# Patient Record
Sex: MALE | Race: WHITE | NOT HISPANIC OR LATINO | Employment: OTHER | URBAN - METROPOLITAN AREA
[De-identification: names, ages, dates, MRNs, and addresses within clinical notes are randomized per-mention and may not be internally consistent; named-entity substitution may affect disease eponyms.]

---

## 2017-08-07 ENCOUNTER — OFFICE VISIT (OUTPATIENT)
Dept: NEUROLOGY | Facility: CLINIC | Age: 82
End: 2017-08-07
Payer: MEDICARE

## 2017-08-07 VITALS
DIASTOLIC BLOOD PRESSURE: 70 MMHG | SYSTOLIC BLOOD PRESSURE: 146 MMHG | WEIGHT: 186.5 LBS | BODY MASS INDEX: 27.62 KG/M2 | HEIGHT: 69 IN | RESPIRATION RATE: 16 BRPM | HEART RATE: 64 BPM

## 2017-08-07 DIAGNOSIS — M54.12 CERVICAL RADICULAR PAIN: Primary | ICD-10-CM

## 2017-08-07 PROCEDURE — 1159F MED LIST DOCD IN RCRD: CPT | Mod: S$GLB,,, | Performed by: PSYCHIATRY & NEUROLOGY

## 2017-08-07 PROCEDURE — 99214 OFFICE O/P EST MOD 30 MIN: CPT | Mod: S$GLB,,, | Performed by: PSYCHIATRY & NEUROLOGY

## 2017-08-07 PROCEDURE — 99999 PR PBB SHADOW E&M-EST. PATIENT-LVL III: CPT | Mod: PBBFAC,,, | Performed by: PSYCHIATRY & NEUROLOGY

## 2017-08-07 PROCEDURE — 3008F BODY MASS INDEX DOCD: CPT | Mod: S$GLB,,, | Performed by: PSYCHIATRY & NEUROLOGY

## 2017-08-07 PROCEDURE — 1126F AMNT PAIN NOTED NONE PRSNT: CPT | Mod: S$GLB,,, | Performed by: PSYCHIATRY & NEUROLOGY

## 2017-08-07 RX ORDER — NIFEDIPINE 60 MG/1
30 TABLET, EXTENDED RELEASE ORAL DAILY
COMMUNITY

## 2017-08-07 RX ORDER — TRAMADOL HYDROCHLORIDE 50 MG/1
50 TABLET ORAL EVERY 6 HOURS PRN
COMMUNITY
End: 2017-12-15

## 2017-08-07 RX ORDER — GABAPENTIN 100 MG/1
CAPSULE ORAL
Qty: 60 CAPSULE | Refills: 12 | Status: SHIPPED | OUTPATIENT
Start: 2017-08-07 | End: 2017-12-15

## 2017-08-07 NOTE — PROGRESS NOTES
HPI: Jesus Burgess is a 81 y.o. male with 2-3 years of episodes of left cervical radicular pain   He still has the pain in the left neck and chest. Pain starts in the upper neck and radiates down the left arm as it has for the past 4 years. Activity makes this worse, rest helps. Symptoms occur near daily. He uses tramadol per PCP for pain which is stabbing and burning.   He can have arms above head without having any symptoms.   He has not seen me in over a year- did not come for EMG/NCS  He had some relief with PT for 6 weeks, then symptoms recurred.      Review of Systems   Constitutional: Negative for fever.   HENT: Negative for nosebleeds.    Eyes: Negative for double vision.   Respiratory: Negative for hemoptysis.    Cardiovascular: Negative for leg swelling.   Gastrointestinal: Negative for blood in stool.   Genitourinary: Negative for hematuria.   Musculoskeletal: Positive for neck pain. Negative for falls.   Skin: Negative for rash.   Neurological: Negative for sensory change.   Psychiatric/Behavioral: The patient does not have insomnia.        I have reviewed all of this patient's past medical and surgical histories as well as family and social histories and active allergies and medications as documented in the electronic medical record.    Exam:  Gen Appearance, well developed/nourished in no apparent distress  CV: 2+ distal pulses with no edema or swelling  Neuro:  MS: Awake, alert, oriented to place, person, time, situation. Sustains attention. Recent/remote memory intact, Language is full to spontaneous speech/repetition/naming/comprehension. Fund of Knowledge is full  CN: Optic discs are flat with normal vasculature, PERRL, Extraoccular movements and visual fields are full. Normal facial sensation and strength, Hearing symmetric, Tongue and Palate are midline and strong. Shoulder Shrug symmetric and strong.  Motor: Normal bulk, tone, no abnormal movements. 5/5 strength bilateral upper/lower extremities  with 2+ reflexes and no clonus  Sensory: symmetric to light touch, pain, temp, and vibration. . Romberg negative  Cerebellar: Finger-nose,Heal-shin, Rapid alternating movements intact  Gait: Normal stance, no ataxia    MRI C spine 2016: Degenerative changes and NF narrowing without significant spinal stenosis.     Assessment/Plan: Jesus Burgess is a 81 y.o. male with 2-3 years of episodes of left cervical radicular pain   I recommend:   1. MRI of the Cervical spine 2016 showed NF narrowing but no major spinal stenosis. He refused EMG prior  2. Physical therapy helped symptoms a bit temporarily   3. Trial of gabapentin for pain per orders Unless sedation, mood changes or other side effects  4. NOTE: Patient has likely idiopathic pulmonary fibrosis treated by Dr Rosado, CAD s/p stent, Dyslipidemia, history of left CEA, right carotid stenosis, HTN and is maintained on ASA and plavix with cardiology. 2/2016 Holter monitor showed no afib. Patient states he can't take NSAIDs due to prior renal failure.   RTC  In 6 months

## 2017-12-15 ENCOUNTER — OFFICE VISIT (OUTPATIENT)
Dept: NEUROLOGY | Facility: CLINIC | Age: 82
End: 2017-12-15
Payer: MEDICARE

## 2017-12-15 VITALS
SYSTOLIC BLOOD PRESSURE: 154 MMHG | HEART RATE: 62 BPM | HEIGHT: 69 IN | RESPIRATION RATE: 16 BRPM | DIASTOLIC BLOOD PRESSURE: 72 MMHG | BODY MASS INDEX: 27.56 KG/M2 | WEIGHT: 186.06 LBS

## 2017-12-15 DIAGNOSIS — M54.12 CERVICAL RADICULAR PAIN: Primary | ICD-10-CM

## 2017-12-15 DIAGNOSIS — J84.10 PULMONARY FIBROSIS: ICD-10-CM

## 2017-12-15 DIAGNOSIS — M51.9 LUMBAR DISC DISEASE: ICD-10-CM

## 2017-12-15 DIAGNOSIS — N18.9 CHRONIC KIDNEY DISEASE, UNSPECIFIED CKD STAGE: ICD-10-CM

## 2017-12-15 PROCEDURE — 99214 OFFICE O/P EST MOD 30 MIN: CPT | Mod: S$GLB,,, | Performed by: PSYCHIATRY & NEUROLOGY

## 2017-12-15 PROCEDURE — 99999 PR PBB SHADOW E&M-EST. PATIENT-LVL IV: CPT | Mod: PBBFAC,,, | Performed by: PSYCHIATRY & NEUROLOGY

## 2017-12-15 RX ORDER — NIFEDIPINE 30 MG/1
TABLET, FILM COATED, EXTENDED RELEASE ORAL
COMMUNITY
Start: 2017-11-27 | End: 2017-12-15 | Stop reason: SDUPTHER

## 2017-12-15 RX ORDER — CLONAZEPAM 0.5 MG/1
0.5 TABLET ORAL NIGHTLY PRN
COMMUNITY
End: 2019-09-10

## 2017-12-15 RX ORDER — ATORVASTATIN CALCIUM 20 MG/1
20 TABLET, FILM COATED ORAL DAILY
COMMUNITY
Start: 2017-11-27 | End: 2018-12-14

## 2017-12-15 RX ORDER — GABAPENTIN 100 MG/1
CAPSULE ORAL
Qty: 120 CAPSULE | Refills: 12 | Status: SHIPPED | OUTPATIENT
Start: 2017-12-15 | End: 2018-06-22

## 2017-12-15 NOTE — PROGRESS NOTES
HPI:  Jesus Burgess is a 81 y.o. male with 2-3 years of episodes of left cervical radicular pain   Patient returns today complaining of his same pain in the left upper chest which radiates down the left arm to the axilla and down to the left upper arm and less so now to the the left hand.   The patient tried gabapentin which helps somewhat with this pain without side effects.   He neck pain daily. No new symptoms.   The patient also has chronic  back pain for which he had MRI with Dr Castro [facility] spine which is treated by Dr Velasquez and he has had lumbar injections which helped prior.   Still following with nephrology for CKD which he reports is stable and pulmonary fibrosis is stable.    Review of Systems   Constitutional: Negative for fever.   HENT: Negative for nosebleeds.    Eyes: Negative for double vision.   Respiratory: Negative for hemoptysis.    Cardiovascular: Negative for leg swelling.   Gastrointestinal: Negative for blood in stool.   Genitourinary: Negative for hematuria.   Musculoskeletal: Positive for neck pain.   Skin: Negative for rash.   Psychiatric/Behavioral: The patient does not have insomnia.        I have reviewed all of this patient's past medical and surgical histories as well as family and social histories and active allergies and medications as documented in the electronic medical record.    Exam:  Gen Appearance, well developed/nourished in no apparent distress  CV: 2+ distal pulses with no edema or swelling  Neuro:  MS: Awake, alert, oriented to place, person, time, situation. Sustains attention. Recent/remote memory intact, Language is full to spontaneous speech/repetition/naming/comprehension. Fund of Knowledge is full  CN: Optic discs are flat with normal vasculature, PERRL, Extraoccular movements and visual fields are full. Normal facial sensation and strength, Hearing symmetric, Tongue and Palate are midline and strong. Shoulder Shrug symmetric and strong.  Motor: Normal bulk, tone, no  abnormal movements. 5/5 strength bilateral upper/lower extremities with 2+ reflexes and no clonus  Sensory: symmetric to light touch, pain, temp, and vibration. . Romberg negative  Cerebellar: Finger-nose,Heal-shin, Rapid alternating movements intact  Gait: Normal stance, no ataxia    MRI C spine 2016: Degenerative changes and NF narrowing without significant spinal stenosis.     MRI L spine: Prior L5-S1 fusion. Moderate bilateral L5-S1 foraminal stenosis. Widely patent central spinal canal at L5-S1.    Multilevel degenerative disease and facet osteoarthritis with mild bilateral foraminal stenosis at L2-3, L3-4 and L4-5      Assessment/Plan: Jesus Burgess is a 81 y.o. male with 2-3 years of episodes of left cervical radicular pain - mostly C5/6 radicular.   I recommend:   1. MRI of the Cervical spine 2016 showed NF narrowing but no major spinal stenosis. He refused EMG prior  2. Physical therapy helped symptoms a bit temporarily   3. Gabapentin helps with pain somewhat: Raise dose to 200mg BID Unless sedation, mood changes or other side effects - likely this will be a max dose for him due to his CKD.   4. Refer to pain management to further discuss options for any further intervention.   5. NOTE: Patient has likely idiopathic pulmonary fibrosis treated by Dr Rosado, CAD s/p stent, Dyslipidemia, history of left CEA, right carotid stenosis, HTN and is maintained on ASA and plavix with cardiology. 2/2016 Holter monitor showed no afib. Patient states he can't take NSAIDs due to prior renal failure. Has had stress test and angiogram with Dr Mcghee at presentation of neck and chest pain symptoms without cardiac cause found.   6. Patient saw Dr Tracy for lumbar disc disease by 2017 MRI L spine - had lumbar injection which helped prior.   RTC  In 6 months

## 2018-06-22 ENCOUNTER — OFFICE VISIT (OUTPATIENT)
Dept: NEUROLOGY | Facility: CLINIC | Age: 83
End: 2018-06-22
Payer: MEDICARE

## 2018-06-22 VITALS
WEIGHT: 187.81 LBS | HEART RATE: 58 BPM | RESPIRATION RATE: 16 BRPM | BODY MASS INDEX: 27.82 KG/M2 | HEIGHT: 69 IN | SYSTOLIC BLOOD PRESSURE: 148 MMHG | DIASTOLIC BLOOD PRESSURE: 68 MMHG

## 2018-06-22 DIAGNOSIS — M51.9 LUMBAR DISC DISEASE: ICD-10-CM

## 2018-06-22 DIAGNOSIS — M54.12 CERVICAL RADICULAR PAIN: Primary | ICD-10-CM

## 2018-06-22 DIAGNOSIS — N18.9 CHRONIC KIDNEY DISEASE, UNSPECIFIED CKD STAGE: ICD-10-CM

## 2018-06-22 DIAGNOSIS — G25.0 BENIGN ESSENTIAL TREMOR: ICD-10-CM

## 2018-06-22 PROCEDURE — 99214 OFFICE O/P EST MOD 30 MIN: CPT | Mod: S$GLB,,, | Performed by: PSYCHIATRY & NEUROLOGY

## 2018-06-22 PROCEDURE — 99999 PR PBB SHADOW E&M-EST. PATIENT-LVL III: CPT | Mod: PBBFAC,,, | Performed by: PSYCHIATRY & NEUROLOGY

## 2018-06-22 RX ORDER — SILDENAFIL 50 MG/1
50 TABLET, FILM COATED ORAL DAILY PRN
COMMUNITY
End: 2021-12-20

## 2018-06-22 RX ORDER — BISOPROLOL FUMARATE 10 MG/1
10 TABLET, FILM COATED ORAL DAILY
COMMUNITY
End: 2023-06-05

## 2018-06-22 NOTE — PROGRESS NOTES
HPI:   Jesus Burgess is a 82 y.o. male with 2-3 years of episodes of left cervical radicular pain - mostly C5/6 radicular  Since the last visit, the patient raised Gabapentin dose but this did not help so he stopped.     I referred him to pain management and he had two injections with Dr Simental which worked temporarily and symptoms did recur. He was told to return for Coolief.   He can still feel some pain at the neck on the left and down the shoulder.   He notes a tremor at times when holding right hand to have a drink.  This occurs more on the right compared to the left. This has been present for years and is infrequent.   His dad had a tremor which was similar.  Patient does have episodic back pain    Review of Systems   Constitutional: Negative for fever.   HENT: Negative for nosebleeds.    Eyes: Negative for double vision.   Respiratory: Negative for hemoptysis.    Cardiovascular: Negative for leg swelling.   Gastrointestinal: Negative for blood in stool.   Genitourinary: Negative for hematuria.   Musculoskeletal: Positive for back pain and neck pain.   Skin: Negative for rash.   Neurological: Positive for tremors.   Psychiatric/Behavioral: The patient does not have insomnia.        I have reviewed all of this patient's past medical and surgical histories as well as family and social histories and active allergies and medications as documented in the electronic medical record.    Exam:  Gen Appearance, well developed/nourished in no apparent distress  CV: 2+ distal pulses with no edema or swelling  Neuro:  MS: Awake, alert, oriented to place, person, time, situation. Sustains attention. Recent/remote memory intact, Language is full to spontaneous speech/repetition/naming/comprehension. Fund of Knowledge is full  CN: Optic discs are flat with normal vasculature, PERRL, Extraoccular movements and visual fields are full. Normal facial sensation and strength, Hearing symmetric, Tongue and Palate are midline and  strong. Shoulder Shrug symmetric and strong.  Motor: Normal bulk, tone, no abnormal movements at rest, but faint tremor of hands out. 5/5 strength bilateral upper/lower extremities with 2+ reflexes and no clonus  Sensory: symmetric to vibration and temp, Romberg negative  Cerebellar: Finger-nose, Rapid alternating movements intact  Gait: Normal stance, no ataxia    MRI C spine 2016: Degenerative changes and NF narrowing without significant spinal stenosis.     MRI L spine: Prior L5-S1 fusion. Moderate bilateral L5-S1 foraminal stenosis. Widely patent central spinal canal at L5-S1.    Multilevel degenerative disease and facet osteoarthritis with mild bilateral foraminal stenosis at L2-3, L3-4 and L4-5      Assessment/Plan: Jesus Burgess is a 82 y.o. male with 2-3 years of episodes of left cervical radicular pain - mostly C5/6 radicular.   I recommend:   1. MRI of the Cervical spine 2016 showed NF narrowing but no major spinal stenosis. He refused EMG prior  2. Physical therapy helped symptoms a bit temporarily   3. Gabapentin did not give long term relief. Note his CKD  4. Having relief with pain management, Dr Simental, procedures and will see him back soon for Coolief   5. NOTE: Patient has likely idiopathic pulmonary fibrosis treated by Dr Rosado, CAD s/p stent, Dyslipidemia, history of left CEA, right carotid stenosis, HTN and is maintained on ASA and plavix with cardiology. 2/2016 Holter monitor showed no afib. Patient states he can't take NSAIDs due to prior renal failure. Has had stress test and angiogram with Dr Mcghee at presentation of neck and chest pain symptoms without cardiac cause found.   6. Patient saw Dr Tracy for lumbar disc disease by 2017 MRI L spine - had lumbar injection which helped prior/ monitor- can see Dr Simental about this if worse.   7. Seems to have benign essential tremor given long term symptoms without current PD features and his family history of the same. Declines  meds for treatment at this time- monitor/ no imaging or work up needed at this point  RTC 1 year

## 2018-12-14 ENCOUNTER — OFFICE VISIT (OUTPATIENT)
Dept: NEUROLOGY | Facility: CLINIC | Age: 83
End: 2018-12-14
Payer: MEDICARE

## 2018-12-14 VITALS
BODY MASS INDEX: 26.55 KG/M2 | RESPIRATION RATE: 16 BRPM | HEART RATE: 56 BPM | SYSTOLIC BLOOD PRESSURE: 144 MMHG | DIASTOLIC BLOOD PRESSURE: 68 MMHG | HEIGHT: 70 IN | WEIGHT: 185.44 LBS

## 2018-12-14 DIAGNOSIS — G25.0 BENIGN ESSENTIAL TREMOR: ICD-10-CM

## 2018-12-14 DIAGNOSIS — M51.9 LUMBAR DISC DISEASE: ICD-10-CM

## 2018-12-14 DIAGNOSIS — M54.12 CERVICAL RADICULAR PAIN: Primary | ICD-10-CM

## 2018-12-14 DIAGNOSIS — J84.10 PULMONARY FIBROSIS: ICD-10-CM

## 2018-12-14 PROCEDURE — 99999 PR PBB SHADOW E&M-EST. PATIENT-LVL III: CPT | Mod: PBBFAC,,, | Performed by: PSYCHIATRY & NEUROLOGY

## 2018-12-14 PROCEDURE — 99214 OFFICE O/P EST MOD 30 MIN: CPT | Mod: S$GLB,,, | Performed by: PSYCHIATRY & NEUROLOGY

## 2018-12-14 RX ORDER — LOSARTAN POTASSIUM AND HYDROCHLOROTHIAZIDE 12.5; 5 MG/1; MG/1
1 TABLET ORAL DAILY
Refills: 3 | COMMUNITY
Start: 2018-12-11 | End: 2019-09-10

## 2018-12-14 NOTE — PROGRESS NOTES
HPI:   Jesus Burgess is a 82 y.o. male with 2-3 years of episodes of left cervical radicular pain - mostly C5/6 radicular.     Had Coolief with Dr Simental pain management since the last visit. And he feels this helped his left sided radicular pain for some times, but then pain recurred.    Patient was offered further injection with Dr Simental with Coolief    Patient has no dizziness, hand numbness or hand discoloration with symptoms.     Review of Systems   Constitutional: Negative for fever.   HENT: Negative for nosebleeds.    Eyes: Negative for double vision.   Respiratory: Negative for hemoptysis.    Cardiovascular: Negative for leg swelling.   Gastrointestinal: Negative for blood in stool.   Genitourinary: Negative for hematuria.   Musculoskeletal: Positive for neck pain.   Skin: Negative for rash.   Neurological: Positive for tremors.        Tremor is about the same   Psychiatric/Behavioral: The patient does not have insomnia.        I have reviewed all of this patient's past medical and surgical histories as well as family and social histories and active allergies and medications as documented in the electronic medical record.    Exam:  Gen Appearance, well developed/nourished in no apparent distress  CV: 2+ distal pulses with no edema or swelling  Neuro:  MS: Awake, alert, oriented to place, person, time, situation. Sustains attention. Recent/remote memory intact, Language is full to spontaneous speech/repetition/naming/comprehension. Fund of Knowledge is full  CN: Optic discs are flat with normal vasculature, PERRL, Extraoccular movements and visual fields are full. Normal facial sensation and strength, Hearing symmetric, Tongue and Palate are midline and strong. Shoulder Shrug symmetric and strong.  Motor: Normal bulk, tone, no abnormal movements at rest, but faint tremor of hands out. 5/5 strength bilateral upper/lower extremities with 2+ reflexes and no clonus  Sensory: symmetric to vibration and  temp, Romberg negative  Cerebellar: Finger-nose, Rapid alternating movements intact  Gait: Normal stance, no ataxia    MRI C spine 2016: Degenerative changes and NF narrowing without significant spinal stenosis.     MRI L spine: Prior L5-S1 fusion. Moderate bilateral L5-S1 foraminal stenosis. Widely patent central spinal canal at L5-S1.    Multilevel degenerative disease and facet osteoarthritis with mild bilateral foraminal stenosis at L2-3, L3-4 and L4-5      Assessment/Plan: Jesus Burgess is a 82 y.o. male with episodes of left cervical radicular pain - mostly C5/6 radicular.   I recommend:   1. MRI of the Cervical spine 2016 showed NF narrowing but no major spinal stenosis. He refused EMG prior  2. Physical therapy helped symptoms a bit temporarily and Coolief procedure with his pain management doc helped as well, but pain has recurred. He is pending another coolief procedure  3. Gabapentin did not give long term relief. Note his CKD  4. If he fails to improve long term: I recommend referral  to neurosurgery to see if he would qualify for intervention given his age but lack of long term relief with conservative measures. He will notify me if neurosurgical referral is needed  5. NOTE: Patient has likely idiopathic pulmonary fibrosis treated by Dr Rosado, CAD s/p stent, Dyslipidemia, history of left CEA, right carotid stenosis, HTN and is maintained on ASA and plavix with cardiology. 2/2016 Holter monitor showed no afib. Patient states he can't take NSAIDs due to prior renal failure. Has had stress test and angiogram with Dr Mcghee at presentation of neck and chest pain symptoms without cardiac cause found.   6. Patient saw Dr Erazo and Eva for lumbar disc disease by 2017 MRI L spine - had lumbar injection which helped prior/ monitor- can see Dr Simental about this if worse.   7. Seems to have benign essential tremor given long term symptoms without current PD features and his family history of the same.  Declines meds for treatment at this time- monitor/ no imaging or work up needed at this point  RTC 6months

## 2019-09-10 ENCOUNTER — OFFICE VISIT (OUTPATIENT)
Dept: NEUROLOGY | Facility: CLINIC | Age: 84
End: 2019-09-10
Payer: MEDICARE

## 2019-09-10 VITALS
HEIGHT: 69 IN | WEIGHT: 182.56 LBS | DIASTOLIC BLOOD PRESSURE: 66 MMHG | HEART RATE: 60 BPM | RESPIRATION RATE: 18 BRPM | BODY MASS INDEX: 27.04 KG/M2 | SYSTOLIC BLOOD PRESSURE: 138 MMHG

## 2019-09-10 DIAGNOSIS — M54.12 CERVICAL RADICULOPATHY: Primary | ICD-10-CM

## 2019-09-10 DIAGNOSIS — G25.0 BENIGN ESSENTIAL TREMOR: ICD-10-CM

## 2019-09-10 DIAGNOSIS — M51.9 LUMBAR DISC DISEASE: ICD-10-CM

## 2019-09-10 PROCEDURE — 99999 PR PBB SHADOW E&M-EST. PATIENT-LVL III: ICD-10-PCS | Mod: PBBFAC,,, | Performed by: PSYCHIATRY & NEUROLOGY

## 2019-09-10 PROCEDURE — 99214 PR OFFICE/OUTPT VISIT, EST, LEVL IV, 30-39 MIN: ICD-10-PCS | Mod: S$GLB,,, | Performed by: PSYCHIATRY & NEUROLOGY

## 2019-09-10 PROCEDURE — 99214 OFFICE O/P EST MOD 30 MIN: CPT | Mod: S$GLB,,, | Performed by: PSYCHIATRY & NEUROLOGY

## 2019-09-10 PROCEDURE — 99999 PR PBB SHADOW E&M-EST. PATIENT-LVL III: CPT | Mod: PBBFAC,,, | Performed by: PSYCHIATRY & NEUROLOGY

## 2019-09-10 NOTE — PROGRESS NOTES
HPI:   Jesus Burgess is a 82 y.o. male with episodes of left cervical radicular pain - mostly C5/6 radicular.     Since the last visit, the patient had another coolief procedure and this helped only temporarily   Neck pain is worsening again and he is interested in surgical consult.  He states he discussed risks with Dr Mcghee as well.   Nekc pain is on the right side especially with turning and pain is radiating to  Both shoulders.   Back Pain is stable  Tremor is still noted by him with some activity but not usually noted    Review of Systems   Constitutional: Negative for fever.   HENT: Negative for nosebleeds.    Eyes: Negative for double vision.   Respiratory: Negative for hemoptysis.    Cardiovascular: Negative for leg swelling.   Gastrointestinal: Negative for blood in stool.   Genitourinary: Negative for hematuria.   Musculoskeletal: Positive for neck pain.   Skin: Negative for rash.   Neurological: Positive for tremors.        Tremor is about the same   Psychiatric/Behavioral: The patient does not have insomnia.        I have reviewed all of this patient's past medical and surgical histories as well as family and social histories and active allergies and medications as documented in the electronic medical record.    Exam:  Gen Appearance, well developed/nourished in no apparent distress  CV: 2+ distal pulses with no edema or swelling  Neuro:  MS: Awake, alert, oriented to place, person, time, situation. Sustains attention. Recent/remote memory intact, Language is full to spontaneous speech/repetition/naming/comprehension. Fund of Knowledge is full  CN: Optic discs are flat with normal vasculature, PERRL, Extraoccular movements and visual fields are full. Normal facial sensation and strength, Hearing symmetric, Tongue and Palate are midline and strong. Shoulder Shrug symmetric and strong.  Motor: Normal bulk, tone, no abnormal movements at rest, but faint tremor of hands out. 5/5 strength bilateral  upper/lower extremities with 2+ reflexes and no clonus  Sensory: symmetric to vibration and temp, Romberg negative  Cerebellar: Finger-nose, Rapid alternating movements intact  Gait: Normal stance, no ataxia    MRI C spine 2016: Degenerative changes and NF narrowing without significant spinal stenosis.     MRI L spine: Prior L5-S1 fusion. Moderate bilateral L5-S1 foraminal stenosis. Widely patent central spinal canal at L5-S1.    Multilevel degenerative disease and facet osteoarthritis with mild bilateral foraminal stenosis at L2-3, L3-4 and L4-5      Assessment/Plan: Jesus Burgess is a 83 y.o. male with episodes of left cervical radicular pain - mostly C5/6 radicular.   I recommend:   1. MRI of the Cervical spine 2016 showed NF narrowing but no major spinal stenosis. He refused EMG prior  2. Physical therapy helped symptoms a bit temporarily and Coolief procedure times 2  with his pain management doc helped as well, but pain recurred.  3. Gabapentin did not give long term relief. Note his CKD  4. He requests consult with spine surgery as he has not had long term relief with cervical radicular symptoms with pain management   -will need updated MRI C spine for this  -referral to neurosurgery also placed  5. NOTE: Patient has likely idiopathic pulmonary fibrosis treated by Dr Rosado, CAD s/p stent, Dyslipidemia, history of left CEA, right carotid stenosis, HTN and is maintained on ASA and plavix with cardiology. 2/2016 Holter monitor showed no afib. Patient states he can't take NSAIDs due to prior renal failure. Has had stress test and angiogram with Dr Mcghee at presentation of neck and chest pain symptoms without cardiac cause found.   6. Patient saw Dr Tracy for lumbar disc disease by 2017 MRI L spine - had lumbar injection which helped prior/ monitor- can see Dr Simental about this PRN  7. Seems to have benign essential tremor given long term symptoms without current PD features and his family  history of the same. Declined meds for treatment at this time- monitor/ no imaging or work up needed at this point  RTC 6 months

## 2019-09-12 ENCOUNTER — HOSPITAL ENCOUNTER (OUTPATIENT)
Dept: RADIOLOGY | Facility: HOSPITAL | Age: 84
Discharge: HOME | End: 2019-09-12
Attending: PSYCHIATRY & NEUROLOGY
Payer: MEDICARE

## 2019-09-12 DIAGNOSIS — M54.12 CERVICAL RADICULOPATHY: ICD-10-CM

## 2019-09-12 PROCEDURE — 72141 MRI NECK SPINE W/O DYE: CPT | Mod: TC

## 2019-09-12 PROCEDURE — 72141 MRI CERVICAL SPINE WITHOUT CONTRAST: ICD-10-PCS | Mod: 26,,, | Performed by: RADIOLOGY

## 2019-09-12 PROCEDURE — 72141 MRI NECK SPINE W/O DYE: CPT | Mod: 26,,, | Performed by: RADIOLOGY

## 2021-11-23 ENCOUNTER — TELEPHONE (OUTPATIENT)
Dept: NEUROLOGY | Facility: CLINIC | Age: 86
End: 2021-11-23
Payer: MEDICARE

## 2021-12-20 ENCOUNTER — OFFICE VISIT (OUTPATIENT)
Dept: NEUROLOGY | Facility: CLINIC | Age: 86
End: 2021-12-20
Payer: MEDICARE

## 2021-12-20 VITALS
HEART RATE: 62 BPM | WEIGHT: 170.88 LBS | SYSTOLIC BLOOD PRESSURE: 96 MMHG | BODY MASS INDEX: 25.31 KG/M2 | RESPIRATION RATE: 16 BRPM | DIASTOLIC BLOOD PRESSURE: 54 MMHG | HEIGHT: 69 IN

## 2021-12-20 DIAGNOSIS — G25.0 BENIGN ESSENTIAL TREMOR: ICD-10-CM

## 2021-12-20 DIAGNOSIS — M54.12 CERVICAL RADICULOPATHY: Primary | ICD-10-CM

## 2021-12-20 DIAGNOSIS — I95.9 HYPOTENSION, UNSPECIFIED HYPOTENSION TYPE: ICD-10-CM

## 2021-12-20 PROCEDURE — 99214 PR OFFICE/OUTPT VISIT, EST, LEVL IV, 30-39 MIN: ICD-10-PCS | Mod: S$GLB,,, | Performed by: PSYCHIATRY & NEUROLOGY

## 2021-12-20 PROCEDURE — 99999 PR PBB SHADOW E&M-EST. PATIENT-LVL III: ICD-10-PCS | Mod: PBBFAC,,, | Performed by: PSYCHIATRY & NEUROLOGY

## 2021-12-20 PROCEDURE — 99214 OFFICE O/P EST MOD 30 MIN: CPT | Mod: S$GLB,,, | Performed by: PSYCHIATRY & NEUROLOGY

## 2021-12-20 PROCEDURE — 99999 PR PBB SHADOW E&M-EST. PATIENT-LVL III: CPT | Mod: PBBFAC,,, | Performed by: PSYCHIATRY & NEUROLOGY

## 2021-12-20 RX ORDER — VIT C/E/ZN/COPPR/LUTEIN/ZEAXAN 250MG-90MG
1000 CAPSULE ORAL DAILY
COMMUNITY
End: 2023-06-05

## 2021-12-20 RX ORDER — CLOPIDOGREL BISULFATE 75 MG/1
75 TABLET ORAL DAILY
COMMUNITY

## 2021-12-20 RX ORDER — MELATONIN 10 MG
1 CAPSULE ORAL DAILY
COMMUNITY
End: 2023-06-05

## 2021-12-20 RX ORDER — NITROGLYCERIN 0.4 MG/1
0.4 TABLET SUBLINGUAL EVERY 5 MIN PRN
COMMUNITY
Start: 2021-12-09

## 2021-12-20 RX ORDER — FLUOXETINE 10 MG/1
10 CAPSULE ORAL DAILY
COMMUNITY
Start: 2021-10-17

## 2021-12-20 RX ORDER — RANOLAZINE 500 MG/1
500 TABLET, EXTENDED RELEASE ORAL 2 TIMES DAILY
COMMUNITY
Start: 2021-12-06

## 2021-12-20 RX ORDER — PANTOPRAZOLE SODIUM 40 MG/1
40 TABLET, DELAYED RELEASE ORAL DAILY
COMMUNITY
Start: 2021-11-14

## 2023-06-05 ENCOUNTER — OFFICE VISIT (OUTPATIENT)
Dept: NEUROLOGY | Facility: CLINIC | Age: 88
End: 2023-06-05
Payer: MEDICARE

## 2023-06-05 ENCOUNTER — HOSPITAL ENCOUNTER (OUTPATIENT)
Dept: RADIOLOGY | Facility: HOSPITAL | Age: 88
Discharge: HOME | End: 2023-06-05
Attending: PSYCHIATRY & NEUROLOGY
Payer: MEDICARE

## 2023-06-05 VITALS
HEART RATE: 66 BPM | BODY MASS INDEX: 25.27 KG/M2 | OXYGEN SATURATION: 95 % | HEIGHT: 69 IN | SYSTOLIC BLOOD PRESSURE: 120 MMHG | WEIGHT: 170.63 LBS | DIASTOLIC BLOOD PRESSURE: 62 MMHG | RESPIRATION RATE: 12 BRPM

## 2023-06-05 DIAGNOSIS — G25.0 BENIGN ESSENTIAL TREMOR: ICD-10-CM

## 2023-06-05 DIAGNOSIS — G25.2 ORTHOSTATIC TREMOR: Primary | ICD-10-CM

## 2023-06-05 DIAGNOSIS — R51.9 NONINTRACTABLE HEADACHE, UNSPECIFIED CHRONICITY PATTERN, UNSPECIFIED HEADACHE TYPE: ICD-10-CM

## 2023-06-05 DIAGNOSIS — G89.29 CHRONIC MIDLINE LOW BACK PAIN, UNSPECIFIED WHETHER SCIATICA PRESENT: ICD-10-CM

## 2023-06-05 DIAGNOSIS — M54.50 CHRONIC MIDLINE LOW BACK PAIN, UNSPECIFIED WHETHER SCIATICA PRESENT: ICD-10-CM

## 2023-06-05 DIAGNOSIS — G25.2 ORTHOSTATIC TREMOR: ICD-10-CM

## 2023-06-05 DIAGNOSIS — M54.12 CERVICAL RADICULOPATHY: ICD-10-CM

## 2023-06-05 PROCEDURE — 1160F PR REVIEW ALL MEDS BY PRESCRIBER/CLIN PHARMACIST DOCUMENTED: ICD-10-PCS | Mod: CPTII,S$GLB,, | Performed by: PSYCHIATRY & NEUROLOGY

## 2023-06-05 PROCEDURE — 1126F AMNT PAIN NOTED NONE PRSNT: CPT | Mod: CPTII,S$GLB,, | Performed by: PSYCHIATRY & NEUROLOGY

## 2023-06-05 PROCEDURE — 99999 PR PBB SHADOW E&M-EST. PATIENT-LVL IV: CPT | Mod: PBBFAC,,, | Performed by: PSYCHIATRY & NEUROLOGY

## 2023-06-05 PROCEDURE — 1159F PR MEDICATION LIST DOCUMENTED IN MEDICAL RECORD: ICD-10-PCS | Mod: CPTII,S$GLB,, | Performed by: PSYCHIATRY & NEUROLOGY

## 2023-06-05 PROCEDURE — 70450 CT HEAD WITHOUT CONTRAST: ICD-10-PCS | Mod: 26,,, | Performed by: RADIOLOGY

## 2023-06-05 PROCEDURE — 99999 PR PBB SHADOW E&M-EST. PATIENT-LVL IV: ICD-10-PCS | Mod: PBBFAC,,, | Performed by: PSYCHIATRY & NEUROLOGY

## 2023-06-05 PROCEDURE — 1126F PR PAIN SEVERITY QUANTIFIED, NO PAIN PRESENT: ICD-10-PCS | Mod: CPTII,S$GLB,, | Performed by: PSYCHIATRY & NEUROLOGY

## 2023-06-05 PROCEDURE — 99215 PR OFFICE/OUTPT VISIT, EST, LEVL V, 40-54 MIN: ICD-10-PCS | Mod: S$GLB,,, | Performed by: PSYCHIATRY & NEUROLOGY

## 2023-06-05 PROCEDURE — 70450 CT HEAD/BRAIN W/O DYE: CPT | Mod: TC

## 2023-06-05 PROCEDURE — 1160F RVW MEDS BY RX/DR IN RCRD: CPT | Mod: CPTII,S$GLB,, | Performed by: PSYCHIATRY & NEUROLOGY

## 2023-06-05 PROCEDURE — 1159F MED LIST DOCD IN RCRD: CPT | Mod: CPTII,S$GLB,, | Performed by: PSYCHIATRY & NEUROLOGY

## 2023-06-05 PROCEDURE — 99215 OFFICE O/P EST HI 40 MIN: CPT | Mod: S$GLB,,, | Performed by: PSYCHIATRY & NEUROLOGY

## 2023-06-05 PROCEDURE — 70450 CT HEAD/BRAIN W/O DYE: CPT | Mod: 26,,, | Performed by: RADIOLOGY

## 2023-06-05 RX ORDER — FAMOTIDINE 40 MG/1
40 TABLET, FILM COATED ORAL NIGHTLY
COMMUNITY
Start: 2023-05-09

## 2023-06-05 RX ORDER — FLUTICASONE PROPIONATE 50 MCG
SPRAY, SUSPENSION (ML) NASAL 2 TIMES DAILY PRN
COMMUNITY
Start: 2023-03-07

## 2023-06-05 NOTE — PROGRESS NOTES
HPI:   Jesus Burgess is a 87 y.o. male  with episodes of left cervical radicular pain - mostly C5/6 radicular.       Has not been seen since 2021    Returns today with complaint of dizziness    Dizziness continues and is described as spells of near syncope with standing    In 2021 he complained of dizziness with lower BP noted    He started having this again 6 months or so ago. His BP would be low at times. He was faint and dizzy at times.    Due this, his BP meds were changed/ lowered. However, he was taken fully off of BP meds for a a week but his BP raised too high and he is on BP meds again for the past week    He had a few spells (twice daily) in which he would stand up and feel shaking in the arms on both side with numbness in the arms and shaking in the arms with wide awake with leg shaking. This sometimes cause him to fall but never faint.   No pain when this occurs. He treats this with slow position changes which helps and this occurred once again in the past few days.     Being off of BP meds seemed to make this worse per him    BP is normal today.     Neck pain is ongoing/ treated with Pain management, Dr Simental      Tremor in hands with activities is not bothersome but still noted    Back pain is noted and treated by Pain management PRN    For 2 weeks, he has noted pain in the frontal head (either side, brief). Seeing flashes of water with head pain    No history of migraines but has had to take meds for headaches at times    Reports some word finding difficulty for the past 3 weeks, not long    Memory is otherwise great.     Review of Systems   Constitutional:  Negative for fever.   HENT:  Negative for nosebleeds.    Eyes:  Negative for double vision.   Respiratory:  Negative for hemoptysis.    Cardiovascular:  Negative for leg swelling.   Gastrointestinal:  Negative for blood in stool.   Genitourinary:  Negative for hematuria.   Musculoskeletal:  Positive for back pain and neck pain.   Skin:   Negative for rash.   Neurological:  Positive for tremors.   Endo/Heme/Allergies:  Does not bruise/bleed easily.     I have reviewed all of this patient's past medical and surgical histories as well as family and social histories and active allergies and medications as documented in the electronic medical record.    Exam:  Gen Appearance, well developed/nourished in no apparent distress  CV: 2+ distal pulses with no edema or swelling  Neuro:  MS: Awake, alert, sustains attention. Recent/remote memory intact, Language is full to spontaneous speech/repetition/naming/comprehension. Fund of Knowledge is full  CN: Optic discs are flat with normal vasculature, PERRL, Extraoccular movements and visual fields are full. Normal facial sensation and strength, Hearing symmetric, Tongue and Palate are midline and strong. Shoulder Shrug symmetric and strong.  Motor: Normal bulk, tone, no abnormal movements at rest, but mild tremor of hands out. 5/5 strength bilateral upper/lower extremities with 2+ reflexes and no clonus  Sensory: symmetric to vibration and temp, Romberg negative  Cerebellar: Finger-nose, Rapid alternating movements intact  Gait: Normal stance, no ataxia.       MRI C spine 2016: Degenerative changes and NF narrowing without significant spinal stenosis.     MRI L spine: Prior L5-S1 fusion. Moderate bilateral L5-S1 foraminal stenosis. Widely patent central spinal canal at L5-S1.    Multilevel degenerative disease and facet osteoarthritis with mild bilateral foraminal stenosis at L2-3, L3-4 and L4-5      MRI T spine 10/2021: multiple level disc degeneration and tiny right disc bulge T7-T8     10/2021: MRI C spine Posterior longitudinal ligament ossification and moderate spinal stenosis    Assessment/Plan: Jesus Burgess is a 87 y.o. male with episodes of left cervical radicular pain - mostly C5/6 radicular and sometimes radicular to the chest  I recommend:     1. He refused EMG prior. No cardiac work up for chest  pain found per cardiology prior.  This was thought to be cervical radicular and patient was reassured/ has some anxiety about this symptom    2. Physical therapy helped symptoms a bit temporarily and Coolief procedure times 2  with his pain management doc helped as well, but pain recurred.  -Seeing Dr Simental pain management   -Gabapentin did not give long term relief. Note his CKD  -2019 updated MRI C spine for this showed continued changes/ mild spinal stenosis. Updated MRI C spine 2021: moderate spinal stenosis and MRI T spine showed no spinal stenosis  -No surgery recommended by [facility] in 2019 and by ortho, Dr Quiñonez in 2021    3. Patient saw Dr Tracy for lumbar disc disease by 2017 MRI L spine - had lumbar injection which helped prior/ monitor-is seeing pain management about this PRN    4. Likely benign essential tremor and family history of the same. Declined meds for treatment at this time- monitor    5. Hypotension noted causing light headedness which is improved with less BP meds. Continue to see cardiology to adjust as needed  -returns in 2023 with tremor in the arms and legs with standing. Suggestive of orthostatic tremor. He states being off of BP meds prior made this worse  -Treat with slow position changes. Sit immediately for any symptoms. Discussed trying a low dose benzo, but he declines as he feels symptoms are less frequent lately  -Also: For 2 weeks, he has noted pain in the frontal head (either side, brief). Seeing flashes of water with head pain  - CT head per orders. Reports some word finding difficulty for the past 3 weeks as well  -See eye doctor to be sure no other cause. Did have recent sinus procedure with ENT otherwise. No clear migraine history    RTC 6 weeks    Total time spent on DOS including chart review: 40 minutes